# Patient Record
Sex: MALE | Race: OTHER | NOT HISPANIC OR LATINO | URBAN - METROPOLITAN AREA
[De-identification: names, ages, dates, MRNs, and addresses within clinical notes are randomized per-mention and may not be internally consistent; named-entity substitution may affect disease eponyms.]

---

## 2017-03-21 NOTE — PATIENT DISCUSSION
(H25.12) Age-related nuclear cataract, left eye - Assesment : Examination revealed cataract. Pt functioning well with mild symptoms. - Plan : Monitor for changes. Advised patient to call our office with decreased vision or increased symptoms. RTC 1 YEAR EXAM/MAC OCT, or sooner for problems.

## 2017-03-21 NOTE — PATIENT DISCUSSION
(H35.371) Puckering of macula, right eye - Assesment : Examination revealed ERM. OCT done today- Stable. - Plan : Monitor. Advised patient to call our office with decreased vision or increased symptoms. RTC 1 YEAR EXAM/MAC OCT.

## 2017-03-21 NOTE — PATIENT DISCUSSION
(O40.426) Keratoconjunct sicca, not specified as Sjogren's, bilateral - Assesment : Examination revealed Dry Eye Syndrome - Plan : Monitor for changes. Advised patient to call our office with decreased vision or increased symptoms. AT'S OU BID-TID.

## 2017-08-25 ENCOUNTER — IMPORTED ENCOUNTER (OUTPATIENT)
Dept: URBAN - METROPOLITAN AREA CLINIC 1 | Facility: CLINIC | Age: 77
End: 2017-08-25

## 2017-08-25 PROBLEM — H40.023: Noted: 2017-08-25

## 2017-08-25 PROBLEM — Z96.1: Noted: 2017-08-25

## 2017-08-25 PROBLEM — H04.123: Noted: 2017-08-25

## 2017-08-25 PROBLEM — H26.492: Noted: 2017-08-25

## 2017-08-25 PROBLEM — H16.143: Noted: 2017-08-25

## 2017-08-25 PROCEDURE — 92133 CPTRZD OPH DX IMG PST SGM ON: CPT

## 2017-08-25 PROCEDURE — 92014 COMPRE OPH EXAM EST PT 1/>: CPT

## 2017-08-25 NOTE — PATIENT DISCUSSION
1.  Glaucoma Suspect OU : (CD 0.8 OU) Neg Fm Hx. OCT shows no progression OU. IOP stable on no meds. Will continue to observe at this time Patient is considered High Risk. 2.  Pseudophakia OU - (Standard) 3. ALAINA w/ PEK OU- Continue ATs TID OU Routinely. 4.  PCO OS: (Posterior Capsule Opacification) Observe 5. Dermatochalasis OU UL's  - Observe Letter to PCP Return for an appointment in 1 yr 27 with Dr. Sigrid Mason.

## 2018-01-30 NOTE — PATIENT DISCUSSION
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 Hx of RD and repair OD x2. - Plan : Monitor.

## 2018-01-30 NOTE — PATIENT DISCUSSION
(H25.12) Age-related nuclear cataract, left eye - Assesment : Examination revealed cataract OS. Pt is bothered ans symptomatic OS. - Plan : Recommended cataract extraction OS to improve visual function OS. All questions answered. Discussed procedure, risks, and benefits. Pt wishes to proceed. Schedule A-scan and consult OS.

## 2018-01-30 NOTE — PATIENT DISCUSSION
(H35.371) Puckering of macula, right eye - Assesment : Examination revealed ERM. Mac OCT stable today. - Plan : Monitor for macular changes with visits and testing. Advised patient to call our office with decreased vision or increased symptoms. RTC in 1 year for Exam and Mac OCT, sooner if problems or changes.

## 2018-02-07 NOTE — PATIENT DISCUSSION
(H25.12) Age-related nuclear cataract, left eye - Assesment : Examination revealed cataract OS. Pt is bothered ans symptomatic OS. Advised patient theres astigmatism present and option of TORIC iol at time f ce. Pt want to proceed with LF does not mind wearing glasses after CE. - Plan : Risks, Benefits and Alternatives were discussed with patient at length for Cataract Surgery. Visual symptoms are consistent with Cataract findings on examination and current refraction no longer provides satisfactory vision. Patient understands and desires surgery. All questions answered. Risks, Benefits and Alternatives discussed at length for IOL placement. Doctor suggests Toric. Patient desires LF. Patient will need to wear glasses for reading.   EYE:OS IOL TYPE:Monofocal POST OPERATIVE TARGET:-0.50 PACKAGE:None Patient to see surgery counselor today

## 2018-03-07 NOTE — PATIENT DISCUSSION
(Z96.1) Presence of intraocular lens - Assesment : Patient is Pseudophakic. 1-2+ DECEMET FOLDS  IOP OS SLIGHTLY ELEVATED  1 GTT TRAVATAN OS @ 8:38 1 GTT COMBIGAN OS @ 8:36 - Plan : Signs and symptoms of infection and retinal detachment are outlined in your surgical packet. Do not rub operated eye. Follow drop schedule.  If redness, pain, decreased vision, flashes or floaters occur then contact clinic. 1 WEEK/ REFRACT / DILATE

## 2018-03-13 NOTE — PATIENT DISCUSSION
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 RECOMMEND F/U WITH DR. KOO FOR POSSIBLE SLT OD.  OR GTT TX OD - Plan : 3 WEEK F/U

## 2018-03-13 NOTE — PATIENT DISCUSSION
(Z96.1) Presence of intraocular lens - Assesment : Patient is Pseudophakic. - Plan : Signs and symptoms of infection and retinal detachment are outlined in your surgical packet. Do not rub operated eye. Follow drop schedule. If redness, pain, decreased vision, flashes or floaters occur then contact clinic. 3 WEEK/ REFRACT ONLY/ FINAL POST OP WITH DR. KOO. DR. Oquendo Pac TO EVALUATE IOP OD ON FINAL POST OP

## 2018-04-05 NOTE — PATIENT DISCUSSION
(Z96.1) Presence of intraocular lens - Assesment : Patient is Pseudophakic OU. Doing well. - Plan : Monitor for changes. Pt to call with any vision changes, increase in floaters, problems, or concerns.

## 2018-04-05 NOTE — PATIENT DISCUSSION
(H40.051) Ocular hypertension, right eye - Assesment : Examination revealed OHTN. IOP borderline today. Pt goes [employer] for the Summer, returning in November. - Plan : Monitor for IOP and NFL changes with visits and testing. If IOP elevated at next appt may consider SLT. RTC in November for IOP Check and OCT [facility], sooner if problems.

## 2018-08-24 ENCOUNTER — IMPORTED ENCOUNTER (OUTPATIENT)
Dept: URBAN - METROPOLITAN AREA CLINIC 1 | Facility: CLINIC | Age: 78
End: 2018-08-24

## 2018-08-24 PROBLEM — H04.123: Noted: 2018-08-24

## 2018-08-24 PROBLEM — Z96.1: Noted: 2018-08-24

## 2018-08-24 PROBLEM — H26.492: Noted: 2018-08-24

## 2018-08-24 PROBLEM — H40.023: Noted: 2018-08-24

## 2018-08-24 PROBLEM — H02.834: Noted: 2018-08-24

## 2018-08-24 PROBLEM — H02.831: Noted: 2018-08-24

## 2018-08-24 PROBLEM — H16.143: Noted: 2018-08-24

## 2018-08-24 PROBLEM — H43.813: Noted: 2018-08-24

## 2018-08-24 PROCEDURE — 92014 COMPRE OPH EXAM EST PT 1/>: CPT

## 2018-08-24 NOTE — PATIENT DISCUSSION
PCO  OS: (Posterior Capsule Opacification)   Observe and consider yag cap when pt feels pco visually significant and visual acuity decreases to appropriate level.

## 2018-08-24 NOTE — PATIENT DISCUSSION
1.  Glaucoma Suspect OU (CD 0.80 OU): Negative family hx. Patient is considered high risk. 2.  ALAINA w/ PEK OU-The use/continuation of artificial tears were recommended. 3.  PCO  OS: (Posterior Capsule Opacification)   Observe and consider yag cap when pt feels pco visually significant and visual acuity decreases to appropriate level. 4. Pseudophakia OU - (Standard OU) 5. PVD OU - RD precautions. 6.  Dermatochalasis OU UL's  - Follow with no intervention at this time. Return for an appointment in 1 year 30/OCT with Dr. Manuel Andujar.

## 2018-12-04 NOTE — PATIENT DISCUSSION
(H40.051) Ocular hypertension, right eye - Assesment : Examination revealed OHTN. IOP 17/12 today. OCT ONH performed: stable today. Pt goes [employer] for the Summer, leaving early this year and returning in November. - Plan : Monitor for IOP and NFL changes with visits and OCTs. RTC in November for Exam and OCT [facility], sooner if problems.

## 2018-12-04 NOTE — PATIENT DISCUSSION
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 - Plan : Can call for Lid Eval if becomes more bothered.

## 2019-03-19 NOTE — PATIENT DISCUSSION
(H35.363) Drusen (degenerative) of macula, bilateral - Assesment : Examination revealed Drusen. Mac OCT performed today: no fluid noted. - Plan : Monitor for macular changes with visits and Mac OCTs. Eat healthy and wear sunglasses. Advised patient to call our office with decreased vision or increased symptoms. RTC in December for Exam, Mac OCT, and OCT [facility], sooner if problems.

## 2019-03-19 NOTE — PATIENT DISCUSSION
(H40.051) Ocular hypertension, right eye - Assesment : Examination revealed hx of OHTN. IOP wnl and stable today. Pt goes [employer] for the Summer, returning in November. - Plan : Monitor for IOP and NFL changes with visits and OCTs. RTC in December for Exam, Mac OCT, and OCT [facility], sooner if problems.

## 2019-03-19 NOTE — PATIENT DISCUSSION
(U97.922) Vitreous degeneration, left eye - Assesment : Examination revealed PVD OS. No holes or tears noted today. - Plan : Monitor for changes. Explained condition. Advised patient to call our office with any decreased vision or any increase in flashes and/or floaters.

## 2019-03-19 NOTE — PATIENT DISCUSSION
Monitor for IOP and NFL changes with visits and OCTs. RTC in December for Exam, Mac OCT, and OCT [facility], sooner if problems.

## 2019-03-19 NOTE — PATIENT DISCUSSION
Monitor for changes. Explained condition. Advised patient to call our office with any decreased vision or any increase in flashes and/or floaters.

## 2019-03-19 NOTE — PATIENT DISCUSSION
(H35.373) Puckering of macula, bilateral - Assesment : Examination revealed ERM. Mac OCT performed today: no fluid noted. - Plan : Monitor for macular changes with visits and Mac OCTs. Advised patient to call our office with decreased vision or increased symptoms. RTC in December for Exam, Mac OCT, and OCT [facility], sooner if problems.

## 2019-03-19 NOTE — PATIENT DISCUSSION
Monitor for macular changes with visits and Mac OCTs. Eat healthy and wear sunglasses. Advised patient to call our office with decreased vision or increased symptoms. RTC in December for Exam, Mac OCT, and OCT [facility], sooner if problems.

## 2019-03-19 NOTE — PATIENT DISCUSSION
Monitor for macular changes with visits and Mac OCTs. Eat healthy and wear sunglasses. Advised patient to call our office with decreased vision or increased symptoms. RTC in December for Exam, Mac OCT, and OCT [facility], sooner if problems.

## 2019-08-23 ENCOUNTER — IMPORTED ENCOUNTER (OUTPATIENT)
Dept: URBAN - METROPOLITAN AREA CLINIC 1 | Facility: CLINIC | Age: 79
End: 2019-08-23

## 2019-08-23 PROBLEM — H40.023: Noted: 2019-08-23

## 2019-08-23 PROBLEM — H02.831: Noted: 2019-08-23

## 2019-08-23 PROBLEM — H02.834: Noted: 2019-08-23

## 2019-08-23 PROBLEM — H16.143: Noted: 2019-08-23

## 2019-08-23 PROBLEM — H04.123: Noted: 2019-08-23

## 2019-08-23 PROBLEM — Z96.1: Noted: 2019-08-23

## 2019-08-23 PROBLEM — H43.813: Noted: 2019-08-23

## 2019-08-23 PROBLEM — H26.492: Noted: 2019-08-23

## 2019-08-23 PROBLEM — H35.013: Noted: 2019-08-23

## 2019-08-23 PROCEDURE — 92014 COMPRE OPH EXAM EST PT 1/>: CPT

## 2019-08-23 PROCEDURE — 92133 CPTRZD OPH DX IMG PST SGM ON: CPT

## 2019-08-23 NOTE — PATIENT DISCUSSION
1.  Glaucoma Suspect OU (CD 0.80 OU): OCT shows minimal RNFL thinning OU. IOP stable. Patient is considered high risk. Condition was discussed with patient and patient understands. Will continue to monitor patient for any progression in condition. Patient was advised to call us with any problems questions or concerns. 2.  ALAINA w/ PEK OU- Recommend ATs TID OU routinely 3. PCO  OS: (Posterior Capsule Opacification)   Observe and consider yag cap when pt feels pco visually significant and visual acuity decreases to appropriate level. 4. Pseudophakia OU - (Standard OU) 5.  Dermatochalasis OU UL's  - Follow with no intervention at this time. 6. GR I Athero Vascular Dz OU7. PVD OU - RD precautions. Patient deferred Manifest Rx today. Return for an appointment in 1 year 30/OCT with Dr. Claudeen Cobble.

## 2020-01-30 NOTE — PATIENT DISCUSSION
Monitor for macular changes with visits and Mac OCTs. Eat healthy and wear sunglasses. Advised patient to call our office with decreased vision or increased symptoms. RTC in 1 year for Exam, Mac OCT, and OCT [facility], sooner if problems.

## 2020-01-30 NOTE — PATIENT DISCUSSION
Monitor for IOP and NFL changes with visits and OCTs. RTC in 1 year for Exam, Mac OCT, and OCT [facility], sooner if problems.

## 2020-08-25 ENCOUNTER — IMPORTED ENCOUNTER (OUTPATIENT)
Dept: URBAN - METROPOLITAN AREA CLINIC 1 | Facility: CLINIC | Age: 80
End: 2020-08-25

## 2020-08-25 PROBLEM — H04.123: Noted: 2020-08-25

## 2020-08-25 PROBLEM — H26.492: Noted: 2020-08-25

## 2020-08-25 PROBLEM — H40.023: Noted: 2020-08-25

## 2020-08-25 PROBLEM — H16.143: Noted: 2020-08-25

## 2020-08-25 PROCEDURE — 92133 CPTRZD OPH DX IMG PST SGM ON: CPT

## 2020-08-25 PROCEDURE — 92014 COMPRE OPH EXAM EST PT 1/>: CPT

## 2020-08-25 NOTE — PATIENT DISCUSSION
1.  Glaucoma Suspect OU (CD 0.80 OU): Slight progression by OCT. IOP stable. Patient is considered high risk. Condition was discussed with patient and patient understands. Will continue to monitor patient for any progression in condition. Patient was advised to call us with any problems questions or concerns. 2.  ALAINA w/ PEK OU- Recommend ATs TID OU routinely 3. PCO  OS: (Posterior Capsule Opacification)   Observe and consider yag cap when pt feels pco visually significant and visual acuity decreases to appropriate level. 4. Pseudophakia OU - (Standard OU) 5.  Dermatochalasis OU UL's  - Follow with no intervention at this time. 6. GR I Athero Vascular Dz OU7. PVD OU - RD precautions. Patient deferred Manifest Rx today. Return for an appointment in 1 year 30/OCT with Dr. Mo Cottrell.

## 2021-04-07 NOTE — PATIENT DISCUSSION
Monitor for macular changes with visits and Mac OCTs. Eat healthy, and wear sunglasses. Call with any vision changes or increased symptoms.

## 2021-08-26 ENCOUNTER — IMPORTED ENCOUNTER (OUTPATIENT)
Dept: URBAN - METROPOLITAN AREA CLINIC 1 | Facility: CLINIC | Age: 81
End: 2021-08-26

## 2021-08-26 PROBLEM — H35.033: Noted: 2021-08-26

## 2021-08-26 PROBLEM — H47.013: Noted: 2021-08-26

## 2021-08-26 PROBLEM — H40.013: Noted: 2021-08-26

## 2021-08-26 PROCEDURE — 99214 OFFICE O/P EST MOD 30 MIN: CPT

## 2021-08-26 PROCEDURE — 92133 CPTRZD OPH DX IMG PST SGM ON: CPT

## 2021-08-26 NOTE — PATIENT DISCUSSION
1.  Glaucoma Suspect OU (CD 0.8 OU) IOP stable and no significant change by OCT. Patient is considered Low Risk given OCT stable and without significant change. Condition was discussed with patient and patient understands. Will continue to monitor patient for any progression in condition. Patient was advised to call us with any problems questions or concerns. 2.  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 ALAINA w/ PEK OU- Recommend ATs TID OU routinely 4. PCO  OS: (Posterior Capsule Opacification)   Observe and consider yag cap when pt feels pco visually significant and visual acuity decreases to appropriate level. 5. Pseudophakia OU - (Standard OU) 6.  Dermatochalasis OU UL's  - Follow with no intervention at this time. 7. GR I Athero Vascular Dz OU8. PVD OU - RD precautions. Return for an appointment in 1 year for a 30 with Dr. Manuel Andujar.

## 2022-04-02 ASSESSMENT — TONOMETRY
OS_IOP_MMHG: 18
OS_IOP_MMHG: 17
OD_IOP_MMHG: 16
OD_IOP_MMHG: 15
OD_IOP_MMHG: 17
OS_IOP_MMHG: 15
OD_IOP_MMHG: 18
OD_IOP_MMHG: 18
OS_IOP_MMHG: 18
OS_IOP_MMHG: 19

## 2022-04-02 ASSESSMENT — VISUAL ACUITY
OD_SC: 20/20
OD_SC: 20/20
OS_SC: 20/20
OD_SC: 20/20
OS_CC: J1
OS_SC: 20/25-2
OS_GLARE: 20/70
OD_CC: 20/25-1
OS_SC: 20/30
OS_SC: 20/70
OD_CC: J1
OD_CC: 20/30-2
OS_CC: 20/25
OD_SC: 20/20
OS_CC: 20/20-1

## 2022-05-11 ENCOUNTER — EMERGENCY VISIT (OUTPATIENT)
Dept: URBAN - METROPOLITAN AREA CLINIC 1 | Facility: CLINIC | Age: 82
End: 2022-05-11

## 2022-05-11 DIAGNOSIS — T15.01XA: ICD-10-CM

## 2022-05-11 PROCEDURE — 65222 REMOVE FOREIGN BODY FROM EYE: CPT

## 2022-05-11 ASSESSMENT — TONOMETRY: OS_IOP_MMHG: 16

## 2022-05-11 ASSESSMENT — VISUAL ACUITY
OD_SC: 20/25
OS_SC: 20/25

## 2022-05-11 NOTE — PATIENT DISCUSSION
Begin Tobradex ST TID OD (sample given) use until sample is gone. Recommended the use of ATs BID-TID OD. Metallic/Aluminum FB removed at slit lamp with forceps without complication.

## 2022-05-11 NOTE — PROCEDURE NOTE: CLINICAL
PROCEDURE NOTE: Removal of Corneal FB at Slit Lamp OD. Diagnosis: Corneal Foreign Body. Anesthesia: Topical. The risks, benefits, and alternatives of foreign body removal were discussed. Metallic/Aluminum FB removed at slit lamp with forceps. Begin Tobradex TID OD (sample given) until sample gone. Davida Schuster

## 2022-05-11 NOTE — PATIENT DISCUSSION
CD 0.8 OU) Patient is considered Low Risk given OCT stable and without significant change. Condition was discussed with patient and patient understands. Will continue to monitor patient for any progression in condition. Patient was advised to call us with any problems questions or concerns.

## 2024-02-19 NOTE — PATIENT DISCUSSION
[facility]  OU Additional Notes: stable Render Risk Assessment In Note?: no Detail Level: Detailed